# Patient Record
Sex: FEMALE | Race: OTHER | HISPANIC OR LATINO
[De-identification: names, ages, dates, MRNs, and addresses within clinical notes are randomized per-mention and may not be internally consistent; named-entity substitution may affect disease eponyms.]

---

## 2021-06-07 ENCOUNTER — APPOINTMENT (OUTPATIENT)
Dept: PULMONOLOGY | Facility: CLINIC | Age: 21
End: 2021-06-07
Payer: MEDICAID

## 2021-06-07 VITALS
OXYGEN SATURATION: 99 % | HEART RATE: 88 BPM | HEIGHT: 61 IN | DIASTOLIC BLOOD PRESSURE: 76 MMHG | SYSTOLIC BLOOD PRESSURE: 110 MMHG | WEIGHT: 200 LBS | BODY MASS INDEX: 37.76 KG/M2

## 2021-06-07 DIAGNOSIS — Z56.0 UNEMPLOYMENT, UNSPECIFIED: ICD-10-CM

## 2021-06-07 DIAGNOSIS — Z78.9 OTHER SPECIFIED HEALTH STATUS: ICD-10-CM

## 2021-06-07 DIAGNOSIS — Z82.5 FAMILY HISTORY OF ASTHMA AND OTHER CHRONIC LOWER RESPIRATORY DISEASES: ICD-10-CM

## 2021-06-07 PROBLEM — Z00.00 ENCOUNTER FOR PREVENTIVE HEALTH EXAMINATION: Status: ACTIVE | Noted: 2021-06-07

## 2021-06-07 PROCEDURE — T1013A: CUSTOM

## 2021-06-07 PROCEDURE — 99204 OFFICE O/P NEW MOD 45 MIN: CPT | Mod: 25

## 2021-06-07 RX ORDER — IPRATROPIUM BROMIDE AND ALBUTEROL 20; 100 UG/1; UG/1
SPRAY, METERED RESPIRATORY (INHALATION)
Refills: 0 | Status: ACTIVE | COMMUNITY

## 2021-06-07 RX ORDER — FLUTICASONE PROPIONATE AND SALMETEROL 500; 50 UG/1; UG/1
POWDER RESPIRATORY (INHALATION)
Refills: 0 | Status: ACTIVE | COMMUNITY

## 2021-06-07 SDOH — ECONOMIC STABILITY - INCOME SECURITY: UNEMPLOYMENT, UNSPECIFIED: Z56.0

## 2021-06-07 NOTE — REVIEW OF SYSTEMS
[SOB on Exertion] : sob on exertion [Back Pain] : back pain [Fever] : no fever [Chills] : no chills [Nasal Congestion] : no nasal congestion [Postnasal Drip] : no postnasal drip [Sinus Problems] : no sinus problems [Cough] : no cough [Chest Tightness] : no chest tightness [Sputum] : no sputum [Dyspnea] : no dyspnea [Pleuritic Pain] : no pleuritic pain [Wheezing] : no wheezing [Edema] : no edema [Palpitations] : no palpitations [Syncope] : no syncope [Seasonal Allergies] : no seasonal allergies [GERD] : no gerd [Abdominal Pain] : no abdominal pain [Nausea] : no nausea [Vomiting] : no vomiting [Diarrhea] : no diarrhea [Constipation] : no constipation

## 2021-06-07 NOTE — HISTORY OF PRESENT ILLNESS
[Initial Eval - Existing Diagnosis] : an initial evaluation of an existing diagnosis of [Intermittent] : intermittent [Dyspnea on Exertion] : dyspnea on exertion [Inhaled Long-Acting Beta-2 Agonists] : inhaled long-acting beta-2 agonists [Inhaled Corticosteroids] : inhaled corticosteroids [Good Compliance] : good compliance with treatment [Good Tolerance] : good tolerance of treatment [Good Symptom Control] : good symptom control [Excess Weight] : excess weight [Dyspnea] : dyspnea [Knee Pain] : knee pain [Low Calorie Diet] : low calorie diet [Fair Compliance] : fair compliance with treatment [Fair Tolerance] : fair tolerance of treatment [Poor Symptom Control] : poor symptom control [High] : high [Low Calorie] : low calorie [Well Balanced Diet] : well balanced meals [___ Times/Week] : exercises [unfilled] times per week [Aerobic Conditioning] : aerobic conditioning [Initial Evaluation] : an initial evaluation of [Excessive Daytime Sleepiness] : excessive daytime sleepiness [Snoring] : snoring [Unrefreshing Sleep] : unrefreshing sleep [Sleepy When Sedentary] : sleepy when sedentary [Currently Experiencing] : The patient is currently experiencing symptoms. [None] : The patient is not currently being treated for this problem [Former] : former [Never] : never [TextBox_4] : Pt presents for evaluation of her reported asthma for which she is maintained on Advair and Combivent. She uses her inhalers only as needed. She was dx'd in [place] as a child though does not remember undergoing any testing.\par She has been getting her medications from [place] from her father. She is not followed for her asthma in the [place].\par She is now 9 weeks pregnant and presents for evaluation and management for her reported asthma.

## 2021-06-07 NOTE — CONSULT LETTER
[Dear  ___] : Dear  [unfilled], [Consult Letter:] : I had the pleasure of evaluating your patient, [unfilled]. [Please see my note below.] : Please see my note below. [Consult Closing:] : Thank you very much for allowing me to participate in the care of this patient.  If you have any questions, please do not hesitate to contact me. [Sincerely,] : Sincerely, [FreeTextEntry3] : Damian Bertrand MD, FCCP, D. ABSM\par Pulmonary and Sleep Medicine\par [facility] at [place]

## 2021-06-07 NOTE — DISCUSSION/SUMMARY
[FreeTextEntry1] : \par #1. Will schedule PFTs in near future to assess lung function with Covid testing prior to PFTs\par #2. The patient does not appear to require chronic BD therapy at this time\par #3. Diet and exercise for weight loss\par #4. SOBOE is likely related to weight or deconditioning \par #5. Continue current inhalers as needed\par #6. Eventual Covid vaccines post partum\par #7. Home PSG to evaluate for possible OMA. \par #8. F/u ASAP with PFTs and again after HST\par #9. Reviewed risks of exposure and symptoms of Covid-19 virus, including how the virus is spread and precautions to avoid blanca virus.\par \par Patient's questions were answered and patient appears to understand these recommendations

## 2021-06-07 NOTE — REASON FOR VISIT
[Initial] : an initial visit [Asthma] : asthma [Shortness of Breath] : shortness of breath [Obesity] : obesity [[employer] ] : provided by [employer]   [Sleep Apnea] : sleep apnea [Time Spent: ____ minutes] : I have spent [unfilled] minutes of time on the encounter. The patient's primary language is not English thus required  services. [FreeTextEntry1] : [id number] [FreeTextEntry2] : Jewel [TWNoteComboBox1] : [place]

## 2021-06-09 ENCOUNTER — ASOB RESULT (OUTPATIENT)
Age: 21
End: 2021-06-09

## 2021-06-09 ENCOUNTER — APPOINTMENT (OUTPATIENT)
Dept: ANTEPARTUM | Facility: CLINIC | Age: 21
End: 2021-06-09
Payer: MEDICAID

## 2021-06-09 PROCEDURE — 76801 OB US < 14 WKS SINGLE FETUS: CPT

## 2021-06-22 ENCOUNTER — OUTPATIENT (OUTPATIENT)
Dept: OUTPATIENT SERVICES | Facility: HOSPITAL | Age: 21
LOS: 1 days | End: 2021-06-22
Payer: MEDICAID

## 2021-06-22 ENCOUNTER — APPOINTMENT (OUTPATIENT)
Age: 21
End: 2021-06-22
Payer: MEDICAID

## 2021-06-22 DIAGNOSIS — G47.33 OBSTRUCTIVE SLEEP APNEA (ADULT) (PEDIATRIC): ICD-10-CM

## 2021-06-22 PROCEDURE — 95810 POLYSOM 6/> YRS 4/> PARAM: CPT | Mod: 26

## 2021-06-22 PROCEDURE — 95810 POLYSOM 6/> YRS 4/> PARAM: CPT

## 2021-07-03 ENCOUNTER — APPOINTMENT (OUTPATIENT)
Dept: DISASTER EMERGENCY | Facility: CLINIC | Age: 21
End: 2021-07-03

## 2021-07-03 ENCOUNTER — LABORATORY RESULT (OUTPATIENT)
Age: 21
End: 2021-07-03

## 2021-07-31 ENCOUNTER — APPOINTMENT (OUTPATIENT)
Dept: DISASTER EMERGENCY | Facility: CLINIC | Age: 21
End: 2021-07-31

## 2021-08-02 LAB — SARS-COV-2 N GENE NPH QL NAA+PROBE: NOT DETECTED

## 2021-08-03 ENCOUNTER — APPOINTMENT (OUTPATIENT)
Dept: PULMONOLOGY | Facility: CLINIC | Age: 21
End: 2021-08-03
Payer: MEDICAID

## 2021-08-03 VITALS — WEIGHT: 200 LBS | HEIGHT: 61 IN | BODY MASS INDEX: 37.76 KG/M2

## 2021-08-03 VITALS
OXYGEN SATURATION: 98 % | DIASTOLIC BLOOD PRESSURE: 60 MMHG | TEMPERATURE: 97.4 F | BODY MASS INDEX: 37.79 KG/M2 | HEIGHT: 61 IN | HEART RATE: 74 BPM | SYSTOLIC BLOOD PRESSURE: 120 MMHG

## 2021-08-03 DIAGNOSIS — Z87.891 PERSONAL HISTORY OF NICOTINE DEPENDENCE: ICD-10-CM

## 2021-08-03 DIAGNOSIS — R06.02 SHORTNESS OF BREATH: ICD-10-CM

## 2021-08-03 PROCEDURE — 94010 BREATHING CAPACITY TEST: CPT

## 2021-08-03 PROCEDURE — T1013: CPT

## 2021-08-03 PROCEDURE — 99214 OFFICE O/P EST MOD 30 MIN: CPT | Mod: 25

## 2021-08-03 PROCEDURE — 94729 DIFFUSING CAPACITY: CPT

## 2021-08-03 PROCEDURE — 85018 HEMOGLOBIN: CPT | Mod: QW

## 2021-08-03 PROCEDURE — 94727 GAS DIL/WSHOT DETER LNG VOL: CPT

## 2021-08-03 NOTE — CONSULT LETTER
[Dear  ___] : Dear  [unfilled], [Consult Letter:] : I had the pleasure of evaluating your patient, [unfilled]. [Please see my note below.] : Please see my note below. [Consult Closing:] : Thank you very much for allowing me to participate in the care of this patient.  If you have any questions, please do not hesitate to contact me. [Sincerely,] : Sincerely, [FreeTextEntry3] : Damian Bertrand MD, FCCP, D. ABSM\par Pulmonary and Sleep Medicine\par [facility] at [place]

## 2021-08-03 NOTE — REASON FOR VISIT
[Follow-Up] : a follow-up visit [Asthma] : asthma [Sleep Apnea] : sleep apnea [Shortness of Breath] : shortness of breath [Obesity] : obesity [[employer] ] : provided by [employer]   [Time Spent: ____ minutes] : Total time spent using  services: [unfilled] minutes. The patient's primary language is not English thus required  services. [FreeTextEntry1] : [id number] [FreeTextEntry2] : Bobby Harper [TWNoteComboBox1] : Egyptian

## 2021-08-03 NOTE — HISTORY OF PRESENT ILLNESS
[Former] : former [Never] : never [Intermittent] : intermittent [Dyspnea on Exertion] : dyspnea on exertion [Inhaled Long-Acting Beta-2 Agonists] : inhaled long-acting beta-2 agonists [Inhaled Corticosteroids] : inhaled corticosteroids [Good Compliance] : good compliance with treatment [Good Tolerance] : good tolerance of treatment [Good Symptom Control] : good symptom control [Excess Weight] : excess weight [Dyspnea] : dyspnea [Knee Pain] : knee pain [Low Calorie Diet] : low calorie diet [Fair Compliance] : fair compliance with treatment [Fair Tolerance] : fair tolerance of treatment [Poor Symptom Control] : poor symptom control [High] : high [Low Calorie] : low calorie [Well Balanced Diet] : well balanced meals [___ Times/Week] : exercises [unfilled] times per week [Aerobic Conditioning] : aerobic conditioning [Follow-Up - Routine Clinic] : a routine clinic follow-up of [Excessive Daytime Sleepiness] : excessive daytime sleepiness [Snoring] : snoring [Unrefreshing Sleep] : unrefreshing sleep [Sleepy When Sedentary] : sleepy when sedentary [Currently Experiencing] : The patient is currently experiencing symptoms. [None] : The patient is not currently being treated for this problem [TextBox_4] : Pt presents for evaluation of her reported asthma for which she is maintained on Advair and Combivent. She uses her inhalers only as needed. She was dx'd in [place] as a child though does not remember undergoing any testing.\par She has been getting her medications from [place] from her father. She is not followed for her asthma in the [place].\par She is now 4 months pregnant and presents for evaluation and management for her reported asthma. She is only using her inhaler as needed and rarely feels like she needs them.

## 2021-08-03 NOTE — DISCUSSION/SUMMARY
[FreeTextEntry1] : \par #1. PFTs performed today are essentially normal.\par #2. The patient does not appear to require chronic BD therapy at this time\par #3. SOBOE is likely related to weight or deconditioning given normal PFTs\par #4. Diet and exercise for weight loss  \par #5. Continue current inhalers as needed\par #6. Eventual Covid vaccines post partum\par #7. PSG to evaluate for possible OMA was negative with an AHI of 0.2\par #8. F/u 3-4 months or after delivery\par #9. Reviewed risks of exposure and symptoms of Covid-19 virus, including how the virus is spread and precautions to avoid blanca virus.\par \par Patient's questions were answered and patient appears to understand these recommendations

## 2021-08-25 ENCOUNTER — ASOB RESULT (OUTPATIENT)
Age: 21
End: 2021-08-25

## 2021-08-25 ENCOUNTER — APPOINTMENT (OUTPATIENT)
Dept: ANTEPARTUM | Facility: CLINIC | Age: 21
End: 2021-08-25
Payer: MEDICAID

## 2021-08-25 ENCOUNTER — APPOINTMENT (OUTPATIENT)
Dept: MATERNAL FETAL MEDICINE | Facility: CLINIC | Age: 21
End: 2021-08-25
Payer: MEDICAID

## 2021-08-25 VITALS
WEIGHT: 201 LBS | HEIGHT: 61.5 IN | DIASTOLIC BLOOD PRESSURE: 62 MMHG | RESPIRATION RATE: 18 BRPM | SYSTOLIC BLOOD PRESSURE: 90 MMHG | OXYGEN SATURATION: 98 % | BODY MASS INDEX: 37.46 KG/M2 | HEART RATE: 63 BPM

## 2021-08-25 DIAGNOSIS — J45.909 DISEASES OF THE RESPIRATORY SYSTEM COMPLICATING PREGNANCY, SECOND TRIMESTER: ICD-10-CM

## 2021-08-25 DIAGNOSIS — Z3A.11 11 WEEKS GESTATION OF PREGNANCY: ICD-10-CM

## 2021-08-25 DIAGNOSIS — O99.512 DISEASES OF THE RESPIRATORY SYSTEM COMPLICATING PREGNANCY, SECOND TRIMESTER: ICD-10-CM

## 2021-08-25 DIAGNOSIS — O34.219 MATERNAL CARE FOR UNSPECIFIED TYPE SCAR FROM PREVIOUS CESAREAN DELIVERY: ICD-10-CM

## 2021-08-25 DIAGNOSIS — E66.9 OBESITY, UNSPECIFIED: ICD-10-CM

## 2021-08-25 DIAGNOSIS — O99.212 OBESITY COMPLICATING PREGNANCY, SECOND TRIMESTER: ICD-10-CM

## 2021-08-25 DIAGNOSIS — Z3A.20 20 WEEKS GESTATION OF PREGNANCY: ICD-10-CM

## 2021-08-25 DIAGNOSIS — B97.7 PAPILLOMAVIRUS AS THE CAUSE OF DISEASES CLASSIFIED ELSEWHERE: ICD-10-CM

## 2021-08-25 DIAGNOSIS — Z87.09 PERSONAL HISTORY OF OTHER DISEASES OF THE RESPIRATORY SYSTEM: ICD-10-CM

## 2021-08-25 DIAGNOSIS — G47.33 OBSTRUCTIVE SLEEP APNEA (ADULT) (PEDIATRIC): ICD-10-CM

## 2021-08-25 PROCEDURE — 76817 TRANSVAGINAL US OBSTETRIC: CPT

## 2021-08-25 PROCEDURE — 99205 OFFICE O/P NEW HI 60 MIN: CPT

## 2021-08-25 PROCEDURE — 76811 OB US DETAILED SNGL FETUS: CPT

## 2021-08-25 PROCEDURE — ZZZZZ: CPT

## 2021-08-25 RX ORDER — ASPIRIN 81 MG/1
81 TABLET, CHEWABLE ORAL DAILY
Qty: 90 | Refills: 3 | Status: ACTIVE | COMMUNITY
Start: 2021-08-25 | End: 1900-01-01

## 2021-08-25 RX ORDER — VITAMIN C, CALCIUM, IRON, VITAMIN D3, VITAMIN E, VITAMIN B1, VITAMIN B2, VITAMIN B3, VITAMIN B6, FOLIC ACID, IODINE, ZINC, COPPER, DOCUSATE SODIUM, DOCOSAHEXAENOIC ACID (DHA) 27-1-50 MG
KIT ORAL
Refills: 0 | Status: ACTIVE | COMMUNITY

## 2021-08-29 PROBLEM — O34.219 PREVIOUS CESAREAN SECTION COMPLICATING PREGNANCY: Status: ACTIVE | Noted: 2021-08-25

## 2021-08-29 PROBLEM — O99.212 OBESITY AFFECTING PREGNANCY IN SECOND TRIMESTER: Status: ACTIVE | Noted: 2021-08-25

## 2021-08-29 PROBLEM — O99.512 ASTHMA AFFECTING PREGNANCY IN SECOND TRIMESTER: Status: ACTIVE | Noted: 2021-08-25

## 2021-08-29 PROBLEM — Z3A.20 20 WEEKS GESTATION OF PREGNANCY: Status: ACTIVE | Noted: 2021-08-25

## 2021-08-29 PROBLEM — G47.33 OSA (OBSTRUCTIVE SLEEP APNEA): Status: ACTIVE | Noted: 2021-06-07

## 2021-08-29 NOTE — VITALS
[US Date: ___] : ultrasound performed on [unfilled]. [GA= ___ Days] : and [unfilled] day(s) [By US] : this is the final MARC [GA= ___ Weeks] : Results were GA of [unfilled] weeks [MARC by US (date): ___] : The calculated MARC by US is [unfilled]

## 2021-08-29 NOTE — OB HISTORY
[Pregnancy History] : patient received anesthesia [___] : pregnancy complications occured [LMP: ___] : LMP: [unfilled] [MARC: ___] : MARC: [unfilled] [EGA: ___ wks] : EGA: [unfilled] wks [Spontaneous] : Spontaneous conception [Sonogram] : sonogram [at ___ wks] : at [unfilled] weeks [FreeTextEntry1] : Spontaneous conception with same partner as her last pregnancy\par Pulmonary consultation 8/3/21

## 2021-08-29 NOTE — DATA REVIEWED
[FreeTextEntry1] : TAUS 8/25/21 - breech, anterior placenta, 338 gm, MVP 4.9 cm, limited evaluation of hands, sacral spine, transverse kidneys, remaining visualized anatomy sonographically normal\par \par TVUS 8/25/21 - CL 3. 8 cm \par \par Pulmonary consult 8/3/21 - normal PFTs

## 2021-08-29 NOTE — CHIEF COMPLAINT
[G ___] : G [unfilled] [P ___] : P [unfilled] [de-identified] : recurrent pregnancy loss, asthma in pregnancy, sleep apnea, obesity and prior  delivery

## 2021-08-29 NOTE — HISTORY OF PRESENT ILLNESS
[FreeTextEntry1] : Jayda Brian is a 21 y.o.  with LMP of 21 and MARC of 22 who presents at 20w1d gestation for  consultation secondary to pregnancy complicated by recurrent pregnancy loss, asthma, obesity, sleep apnea and prior  delivery. Her BMI is 37.4 kg/m2.  She feels well and reports no pregnancy complication to date.  She had recent pulmonary consultation and PFTs on 8/3/21 secondary to asthma and sleep apnea.  PFTs were normal and she does require nighttime CPAP.  She reports no recent asthma exacerbation and has not received the COVID vaccine.  She denies OB or constitutional complaint.

## 2021-08-29 NOTE — DISCUSSION/SUMMARY
[FreeTextEntry1] : With the assistance of a Cymro , we reviewed the following:\par \par Asthma and OMA:  Mild and/or well-controlled moderate asthma can be associated with excellent maternal and  pregnancy outcomes. Severe and poorly controlled asthma may be associated with increased prematurity, need for  delivery, preeclampsia, growth restriction, other  complications and maternal morbidity and mortality. The goal of asthma therapy in pregnancy is to maintain adequate oxygenation of the fetus and preventing hypoxic episodes in the mother. Optimal management of asthma during pregnancy includes objective monitoring of lung function, avoiding or controlling asthma triggers, educating patient and individualizing pharmacologic therapy to maintain normal pulmonary function. This step of care therapeutic approach uses the lowest amount of drug intervention necessary to control the patient’s severity of asthma.  Jayda is currently taking Seretide MDI daily and uses a Combivent MDI for rescue therapy.  She does not have daily symptoms.  She was advised to continue therapy as prescribed.  She was instructed to avoid asthma triggers and if daily use of her rescue inhaler is needed, her maintenance therapy may need to be adjusted.  Any acute asthma exacerbation should be assessed promptly. Regarding her OMA, recent PFTs were normal and she does not require nightly CPAP therapy.  Anesthesia consultation is recommended in the third trimester.  She was strongly encouraged to receive COVID vaccination and safety in pregnancy was reviewed.  Jayda states vaccination is available at her OB office and she will make an appointment for administration. \par \par Elevated maternal BMI:  A BMI > 30 kg/m2 substantially increases the risk of almost every  complication, including but not limited to diabetes, hypertension,  delivery, and birth trauma. Current recommendations are for Jayda to gain no more than 11-20 pounds throughout her pregnancy.   We reviewed healthy diet and lifestyle choices in pregnancy.  Again, serial ultrasound evaluation of fetal growth is recommended. \par \par Prior CD:  Jayda expresses interest in a trial of labor after  delivery (TOLAC).  We reviewed the risks, benefits, and alternatives of TOLAC versus an elective repeat  delivery (CD). Overall, women who attempt TOLAC have an approximate 75% success rate.  The rate is affected by various antepartum, intrapartum, and nonmedical factors.  The highest success rates are found among women who have had a successful vaginal delivery before or after their  delivery, who present in active labor, and in whom the indication for the prior  delivery was fetal malpresentation. Conversely, women who have never had a vaginal delivery, need labor induction (especially with an unfavorable cervix), had a previous  for failure to progress or nonreassuring tracing, have a macrosomic infant, or are post-term have a reduced likelihood of a successful TOLAC.    Patients with a previous low transverse uterine incision and no contraindications for vaginal delivery are candidates for TOLAC. Although there is no reliable way to predict TOLAC success, we discussed that optimal candidates are patients with one previous low transverse CD, a clinically adequate pelvis for vaginal delivery, no other uterine scars or uterine surgery,  a previous successful vaginal delivery, an estimated fetal weight < 4000g, a favorable Lewis score on cervical examination or active labor, non-obese and non-diabetic women, not exceeding the appropriate weight gain for pregnancy and an inter-delivery interval > 18 to 24 months.  Jayda was quoted a 0.5% risk of uterine rupture with spontaneous labor and a doubling of her risk (1%) with induction of labor.  Her chance for successful TOLAC is greater in the presence of spontaneous labor. Jayda is aware there will be ongoing discussion throughout the duration of pregnancy; as conditions may arise that alter the risks versus benefits of the planned route of delivery.   \par \par Recurrent first trimester loss:  OB history is significant for one full term  delivery followed by first trimester ETOP and two first trimester SAB.  We reviewed the various etiology of first trimester loss and she was reassured given her history of full term vaginal delivery.  She will continue low dose aspirin therapy for preeclampsia prevention and to improve overall pregnancy outcome and she was advised to increase dose to 81 mg alternating with 162 mg daily.  No further testing or intervention is warranted at this time.

## 2021-08-29 NOTE — ACTIVE PROBLEMS
[Diabetes Mellitus] : no diabetes mellitus [Hypertension] : no hypertension [Heart Disease] : no heart disease [Autoimmune Disease] : no autoimmune disease [Renal Disease] : no kidney disease, no UTI [Neurologic Disorder] : no neurologic disorder, no epilepsy [Psychiatric Disorders] : no psychiatric disorders [Hepatic Disorder] : no hepatitis, no liver disease [Depression] : no depression, no post partum depression [Thrombophlebitis] : no varicosities, no phlebitis [Thyroid Disorder] : no thyroid dysfunction [Trauma] : no trauma/violence [Blood Transfusion (___ Ml)] : no history of blood transfusion

## 2021-08-29 NOTE — REVIEW OF SYSTEMS
[Fever] : no fever [Sight Problems] : no sight problems [Nasal Discharge] : no nasal discharge [Chest Pain] : no chest pain [Dyspnea] : no shortness of breath [Wheezing] : no wheezing [Cough] : no cough [Abdominal Pain] : no abdominal pain [Vomiting] : no vomiting [Pelvic Pain] : no pelvic pain [Arthralgias] : no arthralgias [Abn Vag Bleeding] : no abnormal vaginal bleeding [Breast Pain] : no breast pain [Headache] : no headache [Anxiety] : no anxiety [Deepening Voice] : no deepening of the voice [Easy Bleeding] : does not bleed easily [Easy Bruising] : does not bruise easily [Feeling Weak] : no feelings of weakness

## 2021-08-29 NOTE — PHYSICAL EXAM
[FreeTextEntry1] : Patient presents as a well appearing gravid and obese female.  Ambulates without difficulty.  Answers questions appropriately without cough or difficulty breathing. There is no peripheral edema or visible rash.  FHR noted on ultrasound.

## 2021-08-29 NOTE — FAMILY HISTORY
[Age 35+ During Pregnancy] : not 35 or over during pregnancy [Reported Family History Of Birth Defects] : no congenital heart defects [Emanuel-Sachs Carrier] : no Emanuel-Sachs [Family History] : no mental retardation/autism [Reported Family History Of Genetic Disease] : no history of child defect in child of baby father

## 2021-09-16 ENCOUNTER — APPOINTMENT (OUTPATIENT)
Dept: ANTEPARTUM | Facility: CLINIC | Age: 21
End: 2021-09-16
Payer: MEDICAID

## 2021-09-16 ENCOUNTER — ASOB RESULT (OUTPATIENT)
Age: 21
End: 2021-09-16

## 2021-09-16 PROCEDURE — 76816 OB US FOLLOW-UP PER FETUS: CPT

## 2021-10-22 ENCOUNTER — ASOB RESULT (OUTPATIENT)
Age: 21
End: 2021-10-22

## 2021-10-22 ENCOUNTER — APPOINTMENT (OUTPATIENT)
Dept: ANTEPARTUM | Facility: CLINIC | Age: 21
End: 2021-10-22
Payer: MEDICAID

## 2021-10-22 PROCEDURE — 76816 OB US FOLLOW-UP PER FETUS: CPT

## 2021-12-03 ENCOUNTER — APPOINTMENT (OUTPATIENT)
Dept: ANTEPARTUM | Facility: CLINIC | Age: 21
End: 2021-12-03
Payer: MEDICAID

## 2021-12-03 ENCOUNTER — ASOB RESULT (OUTPATIENT)
Age: 21
End: 2021-12-03

## 2021-12-03 PROCEDURE — 76816 OB US FOLLOW-UP PER FETUS: CPT

## 2021-12-03 PROCEDURE — 76819 FETAL BIOPHYS PROFIL W/O NST: CPT

## 2021-12-22 ENCOUNTER — OUTPATIENT (OUTPATIENT)
Dept: OUTPATIENT SERVICES | Facility: HOSPITAL | Age: 21
LOS: 1 days | End: 2021-12-22

## 2021-12-22 ENCOUNTER — OUTPATIENT (OUTPATIENT)
Dept: INPATIENT UNIT | Facility: HOSPITAL | Age: 21
LOS: 1 days | End: 2021-12-22
Payer: COMMERCIAL

## 2021-12-22 VITALS
HEART RATE: 79 BPM | WEIGHT: 209.44 LBS | DIASTOLIC BLOOD PRESSURE: 60 MMHG | TEMPERATURE: 98 F | HEIGHT: 62 IN | RESPIRATION RATE: 20 BRPM | SYSTOLIC BLOOD PRESSURE: 100 MMHG

## 2021-12-22 VITALS — HEART RATE: 76 BPM | SYSTOLIC BLOOD PRESSURE: 111 MMHG | DIASTOLIC BLOOD PRESSURE: 63 MMHG

## 2021-12-22 VITALS — DIASTOLIC BLOOD PRESSURE: 59 MMHG | HEART RATE: 78 BPM | SYSTOLIC BLOOD PRESSURE: 102 MMHG

## 2021-12-22 DIAGNOSIS — O47.1 FALSE LABOR AT OR AFTER 37 COMPLETED WEEKS OF GESTATION: ICD-10-CM

## 2021-12-22 DIAGNOSIS — Z01.818 ENCOUNTER FOR OTHER PREPROCEDURAL EXAMINATION: ICD-10-CM

## 2021-12-22 DIAGNOSIS — Z98.891 HISTORY OF UTERINE SCAR FROM PREVIOUS SURGERY: Chronic | ICD-10-CM

## 2021-12-22 LAB
BASOPHILS # BLD AUTO: 0.04 K/UL — SIGNIFICANT CHANGE UP (ref 0–0.2)
BASOPHILS NFR BLD AUTO: 0.3 % — SIGNIFICANT CHANGE UP (ref 0–2)
BLD GP AB SCN SERPL QL: SIGNIFICANT CHANGE UP
EOSINOPHIL # BLD AUTO: 0.36 K/UL — SIGNIFICANT CHANGE UP (ref 0–0.5)
EOSINOPHIL NFR BLD AUTO: 2.7 % — SIGNIFICANT CHANGE UP (ref 0–6)
HCT VFR BLD CALC: 38.6 % — SIGNIFICANT CHANGE UP (ref 34.5–45)
HGB BLD-MCNC: 12.7 G/DL — SIGNIFICANT CHANGE UP (ref 11.5–15.5)
IMM GRANULOCYTES NFR BLD AUTO: 0.4 % — SIGNIFICANT CHANGE UP (ref 0–1.5)
LYMPHOCYTES # BLD AUTO: 2.82 K/UL — SIGNIFICANT CHANGE UP (ref 1–3.3)
LYMPHOCYTES # BLD AUTO: 21.2 % — SIGNIFICANT CHANGE UP (ref 13–44)
MCHC RBC-ENTMCNC: 28.3 PG — SIGNIFICANT CHANGE UP (ref 27–34)
MCHC RBC-ENTMCNC: 32.9 GM/DL — SIGNIFICANT CHANGE UP (ref 32–36)
MCV RBC AUTO: 86.2 FL — SIGNIFICANT CHANGE UP (ref 80–100)
MEV IGG SER-ACNC: 134 AU/ML — SIGNIFICANT CHANGE UP
MEV IGG+IGM SER-IMP: POSITIVE — SIGNIFICANT CHANGE UP
MONOCYTES # BLD AUTO: 0.89 K/UL — SIGNIFICANT CHANGE UP (ref 0–0.9)
MONOCYTES NFR BLD AUTO: 6.7 % — SIGNIFICANT CHANGE UP (ref 2–14)
NEUTROPHILS # BLD AUTO: 9.12 K/UL — HIGH (ref 1.8–7.4)
NEUTROPHILS NFR BLD AUTO: 68.7 % — SIGNIFICANT CHANGE UP (ref 43–77)
PLATELET # BLD AUTO: 295 K/UL — SIGNIFICANT CHANGE UP (ref 150–400)
RBC # BLD: 4.48 M/UL — SIGNIFICANT CHANGE UP (ref 3.8–5.2)
RBC # FLD: 13.2 % — SIGNIFICANT CHANGE UP (ref 10.3–14.5)
WBC # BLD: 13.28 K/UL — HIGH (ref 3.8–10.5)
WBC # FLD AUTO: 13.28 K/UL — HIGH (ref 3.8–10.5)

## 2021-12-22 PROCEDURE — 84112 EVAL AMNIOTIC FLUID PROTEIN: CPT

## 2021-12-22 PROCEDURE — 59025 FETAL NON-STRESS TEST: CPT | Mod: 26

## 2021-12-22 PROCEDURE — G0463: CPT

## 2021-12-22 PROCEDURE — 76815 OB US LIMITED FETUS(S): CPT

## 2021-12-22 PROCEDURE — 59025 FETAL NON-STRESS TEST: CPT

## 2021-12-22 NOTE — OB PROVIDER TRIAGE NOTE - NSOBPROVIDERNOTE_OBGYN_ALL_OB_FT
21y  at 38.1 weeks GA by LMP  who presents to L&D for possible SROM.    A/P:  - VSS  - no pooling of fluids noted, Amnisure negative, FIORELLA of 10.4   - Cervix closed on both speculum and digital exam  - low suspicion for rupture of membranes at this time   - Patient 21y  at 37.1 weeks GA by first trimester sono who presents to L&D for possible SROM.    A/P:  - VSS   - NST reactive   - no pooling of fluids noted, Amnisure negative, FIORELLA of 10.4   - Cervix closed on both speculum and digital exam  - low suspicion for rupture of membranes at this time   - Precautions given  - Dispo: home     Discussed with Dr. Cronin 21y  at 37.1 weeks GA by first trimester sono who presents to L&D for possible SROM.    A/P:  - VSS   - NST reactive   - no pooling of fluids noted, Amnisure negative, FIORELLA of 10.4   - Cervix closed on both speculum and digital exam  - low suspicion for rupture of membranes at this time   - Precautions given  - Dispo: home     Discussed with Dr. Cronin    OB attending:  resident note reviewed   moderate variability +accels no decels  rupture of membranes not suspected at this time, fetal testing reassuring  Janine Cronin MD

## 2021-12-22 NOTE — OB RN TRIAGE NOTE - FALL HARM RISK - UNIVERSAL INTERVENTIONS
Bed in lowest position, wheels locked, appropriate side rails in place/Call bell, personal items and telephone in reach/Instruct patient to call for assistance before getting out of bed or chair/Non-slip footwear when patient is out of bed/[place] to call system/Physically safe environment - no spills, clutter or unnecessary equipment/Purposeful Proactive Rounding/Room/bathroom lighting operational, light cord in reach

## 2021-12-22 NOTE — OB PROVIDER TRIAGE NOTE - HISTORY OF PRESENT ILLNESS
6/2/21 ME PERSISTS , HGA1C IMPROVED TO 8.5. FOCAL LASER WITHIN 1 WEEK. 21y  at 38.1 weeks GA by LMP  who presents to L&D for possible SROM. She states noticing leakage of fluid since Saturday. It was mucous in consistency at first but is now thinner in consistency. She notes no color to it. Endorses noting her underwear has been wet a few times. Patient denies vaginal bleeding, contractions or vaginal discharge. She endorses good fetal movement. Denies fevers, chills, nausea, vomiting, chest pain, SOB, dizziness and headache.   She is scheduled for a repeat .  MARC: 22  LMP: 3/31/21  Prenatal course is significant for:  Hx of asthma - followed by Pulm, currently on combivent, fluticasone prn, patient states she uses 1 week.   Pruritic bullous rash - pt using corticosteroid topic cream     POB:  G1 - 2014 - pC/S FT, uncomplicated  G2 -  - sAB s/p D&C   G3 -  - sAB   G4 -  - sAB  PGYN: -fibroids, -ovarian cysts, denies STD hx, denies abnormal PAPs   PMH: asthma  PSH: C/S x1  SH: Denies EtOH, tobacco and illicit drug use during this pregnancy; feels safe at home   Meds: PNVs, ASA 81 mg   Allergies: NKDA   21y  at 37.1 weeks GA by first trimester sono who presents to L&D for possible SROM. She states noticing leakage of fluid since Saturday. It was mucous in consistency at first but is now thinner in consistency. She notes no color to it. Endorses noting her underwear has been wet a few times. Patient denies vaginal bleeding, contractions or vaginal discharge. She endorses good fetal movement. Denies fevers, chills, nausea, vomiting, chest pain, SOB, dizziness and headache.   She is scheduled for a repeat .    MARC: 22, based on first trimester sono   LMP: 3/31/21  Prenatal course is significant for:  Hx of asthma - followed by Pulm, currently on combivent, fluticasone prn, patient states she uses 1 week.   Pruritic bullous rash - pt using corticosteroid topic cream     POB:  G1 - 2014 - pC/S FT, uncomplicated  G2 -  - sAB s/p D&C   G3 -  - sAB   G4 -  - sAB  PGYN: -fibroids, -ovarian cysts, denies STD hx, denies abnormal PAPs   PMH: asthma  PSH: C/S x1  SH: Denies EtOH, tobacco and illicit drug use during this pregnancy; feels safe at home   Meds: PNVs, ASA 81 mg   Allergies: NKDA

## 2021-12-22 NOTE — OB PROVIDER TRIAGE NOTE - NSHPPHYSICALEXAM_GEN_ALL_CORE
T(C): 36.8 (12-22-21 @ 09:02), Max: 36.8 (12-22-21 @ 09:02)  HR: 76 (12-22-21 @ 09:02) (76 - 79)  BP: 111/63 (12-22-21 @ 09:02) (100/60 - 111/63)  RR: 16 (12-22-21 @ 09:02) (16 - 20)  SpO2: --    Gen: NAD, well-appearing, AAOx3   Abd: Soft, gravid  Ext: non-tender, non-edematous  SSE: no pooling of fluids, no lesions or vaginal bleeding noted. Physiologic discharge seen  SVE:  0/0/-3  Bedside sono: vertex, anterior palcenta, FIORELLA 10.4   FHT: 150 bpm,moderate variability, +accels, -deccel  [place]: no ctx      A/P:   -Admit to L&D  -Consent  -Admission labs  -NPO, except ice chips   -IV fluids  -Labor: Intact/*ROM. Latent/Active labor. [place] *.   -Fetus: Cat I tracing. Continuous toco and fetal monitoring.   -GBS: Negative, no GBS ppx required   -Analgesia:     Discussed with  _ T(C): 36.8 (12-22-21 @ 09:02), Max: 36.8 (12-22-21 @ 09:02)  HR: 76 (12-22-21 @ 09:02) (76 - 79)  BP: 111/63 (12-22-21 @ 09:02) (100/60 - 111/63)  RR: 16 (12-22-21 @ 09:02) (16 - 20)  SpO2: --    Gen: NAD, well-appearing, AAOx3   Abd: Soft, gravid  Ext: non-tender, non-edematous  SSE: no pooling of fluids, no lesions or vaginal bleeding noted. Physiologic discharge seen  SVE:  0/0/-3  Bedside sono: vertex, anterior palcenta, FIORELLA 10.4   FHT: 150 bpm,moderate variability, +accels, -deccel  [place]: no ctx

## 2021-12-30 ENCOUNTER — ASOB RESULT (OUTPATIENT)
Age: 21
End: 2021-12-30

## 2021-12-30 ENCOUNTER — APPOINTMENT (OUTPATIENT)
Dept: ANTEPARTUM | Facility: CLINIC | Age: 21
End: 2021-12-30
Payer: MEDICAID

## 2021-12-30 PROCEDURE — 76819 FETAL BIOPHYS PROFIL W/O NST: CPT

## 2021-12-30 PROCEDURE — 76816 OB US FOLLOW-UP PER FETUS: CPT

## 2022-01-03 RX ORDER — OXYTOCIN 10 UNIT/ML
333.33 VIAL (ML) INJECTION
Qty: 20 | Refills: 0 | Status: COMPLETED | OUTPATIENT
Start: 2022-01-07 | End: 2022-01-07

## 2022-01-03 RX ORDER — SODIUM CHLORIDE 9 MG/ML
1000 INJECTION, SOLUTION INTRAVENOUS
Refills: 0 | Status: DISCONTINUED | OUTPATIENT
Start: 2022-01-07 | End: 2022-01-07

## 2022-01-03 RX ORDER — CEFAZOLIN SODIUM 1 G
2000 VIAL (EA) INJECTION ONCE
Refills: 0 | Status: COMPLETED | OUTPATIENT
Start: 2022-01-07 | End: 2022-01-07

## 2022-01-03 RX ORDER — FAMOTIDINE 10 MG/ML
20 INJECTION INTRAVENOUS ONCE
Refills: 0 | Status: COMPLETED | OUTPATIENT
Start: 2022-01-07 | End: 2022-01-07

## 2022-01-03 RX ORDER — CITRIC ACID/SODIUM CITRATE 300-500 MG
30 SOLUTION, ORAL ORAL ONCE
Refills: 0 | Status: COMPLETED | OUTPATIENT
Start: 2022-01-07 | End: 2022-01-07

## 2022-01-03 RX ORDER — SODIUM CHLORIDE 9 MG/ML
1000 INJECTION, SOLUTION INTRAVENOUS ONCE
Refills: 0 | Status: DISCONTINUED | OUTPATIENT
Start: 2022-01-07 | End: 2022-01-07

## 2022-01-03 NOTE — OB PROVIDER H&P - ASSESSMENT
Jayda Waterman is a 21 year old  at 39w1d who presents to L&D for rCS.    Jayda Waterman is a 21 year old  at 39w3d who presents to L&D for rCS.

## 2022-01-03 NOTE — OB PROVIDER H&P - HISTORY OF PRESENT ILLNESS
Jayda Waterman is a 21 year old  at 39w1d who presents to L&D for rCS.     MARC: 22   LMP: 21     Pregnancy course:    1. Hx of recurrent pregnancy loss, on ASA   2. Hx of asthma, Combivent and fluticasone prn     POB:   G1 - 2014 - pC/S FT, uncomplicated   G2 -  - sAB s/p D&C    G3 -  - sAB    G4 -  – sAB   G5 - current   PGYN: -fibroids, -ovarian cysts, denies STD hx, denies abnormal PAPs    PMH: asthma   PSH: C/S x1   SH: Denies EtOH, tobacco and illicit drug use during this pregnancy   Meds: PNVs, ASA 81 mg, Combivent prn, fluticasone prn   Allergies: NKDA     BMI: 38.54   Ultrasound: VTX, anterior ()   EFW: 3175g ()  Jayda Waterman is a 21 year old  at 39w3d who presents to L&D for rCS.     MARC: 22   LMP: 21     Pregnancy course:    1. Hx of recurrent pregnancy loss, on ASA   2. Hx of asthma, Combivent and fluticasone prn     POB:   G1 - 2014 - pC/S FT, uncomplicated   G2 -  - sAB s/p D&C    G3 -  - sAB    G4 -  – sAB   G5 - current   PGYN: -fibroids, -ovarian cysts, denies STD hx, denies abnormal PAPs    PMH: asthma   PSH: C/S x1   SH: Denies EtOH, tobacco and illicit drug use during this pregnancy   Meds: PNVs, ASA 81 mg, Combivent prn, fluticasone prn   Allergies: NKDA     BMI: 38.54   Ultrasound: VTX, anterior ()   EFW: 3175g ()

## 2022-01-05 ENCOUNTER — OUTPATIENT (OUTPATIENT)
Dept: INPATIENT UNIT | Facility: HOSPITAL | Age: 22
LOS: 1 days | End: 2022-01-05
Payer: COMMERCIAL

## 2022-01-05 DIAGNOSIS — Z98.891 HISTORY OF UTERINE SCAR FROM PREVIOUS SURGERY: Chronic | ICD-10-CM

## 2022-01-05 DIAGNOSIS — O47.1 FALSE LABOR AT OR AFTER 37 COMPLETED WEEKS OF GESTATION: ICD-10-CM

## 2022-01-05 PROBLEM — J45.909 UNSPECIFIED ASTHMA, UNCOMPLICATED: Chronic | Status: ACTIVE | Noted: 2021-12-22

## 2022-01-05 PROBLEM — O02.1 MISSED ABORTION: Chronic | Status: ACTIVE | Noted: 2021-12-22

## 2022-01-05 LAB
HCT VFR BLD CALC: 41.1 % — SIGNIFICANT CHANGE UP (ref 34.5–45)
HGB BLD-MCNC: 13.4 G/DL — SIGNIFICANT CHANGE UP (ref 11.5–15.5)
MCHC RBC-ENTMCNC: 28.5 PG — SIGNIFICANT CHANGE UP (ref 27–34)
MCHC RBC-ENTMCNC: 32.6 GM/DL — SIGNIFICANT CHANGE UP (ref 32–36)
MCV RBC AUTO: 87.4 FL — SIGNIFICANT CHANGE UP (ref 80–100)
PLATELET # BLD AUTO: 358 K/UL — SIGNIFICANT CHANGE UP (ref 150–400)
RBC # BLD: 4.7 M/UL — SIGNIFICANT CHANGE UP (ref 3.8–5.2)
RBC # FLD: 13.3 % — SIGNIFICANT CHANGE UP (ref 10.3–14.5)
SARS-COV-2 RNA SPEC QL NAA+PROBE: SIGNIFICANT CHANGE UP
WBC # BLD: 13.01 K/UL — HIGH (ref 3.8–10.5)
WBC # FLD AUTO: 13.01 K/UL — HIGH (ref 3.8–10.5)

## 2022-01-05 PROCEDURE — U0005: CPT

## 2022-01-05 PROCEDURE — 99213 OFFICE O/P EST LOW 20 MIN: CPT | Mod: TH,25

## 2022-01-05 PROCEDURE — 36415 COLL VENOUS BLD VENIPUNCTURE: CPT

## 2022-01-05 PROCEDURE — 59025 FETAL NON-STRESS TEST: CPT | Mod: 26

## 2022-01-05 PROCEDURE — U0003: CPT

## 2022-01-05 PROCEDURE — G0463: CPT

## 2022-01-05 PROCEDURE — 85027 COMPLETE CBC AUTOMATED: CPT

## 2022-01-05 PROCEDURE — 59025 FETAL NON-STRESS TEST: CPT

## 2022-01-05 NOTE — OB PROVIDER TRIAGE NOTE - ADDITIONAL INSTRUCTIONS
rescheduled for Friday 9:30 am.  Patient is aware she needs to not eat or drink after Midnight on Friday morning and that her partner will need a new PCR for Covid today in preparation for Friday.

## 2022-01-05 NOTE — OB PROVIDER TRIAGE NOTE - HISTORY OF PRESENT ILLNESS
22 yo  at 39.1 weeks missed her scheduled repeat C/S this am due to transportation issues. She reports good fetal movement, no contractions, loss of fluid or vaginal bleeding.   PNC: routine, defers TOLAC

## 2022-01-06 ENCOUNTER — TRANSCRIPTION ENCOUNTER (OUTPATIENT)
Age: 22
End: 2022-01-06

## 2022-01-07 ENCOUNTER — INPATIENT (INPATIENT)
Facility: HOSPITAL | Age: 22
LOS: 1 days | Discharge: HOME | End: 2022-01-09
Attending: OBSTETRICS & GYNECOLOGY | Admitting: OBSTETRICS & GYNECOLOGY
Payer: COMMERCIAL

## 2022-01-07 ENCOUNTER — TRANSCRIPTION ENCOUNTER (OUTPATIENT)
Age: 22
End: 2022-01-07

## 2022-01-07 VITALS — TEMPERATURE: 99 F

## 2022-01-07 DIAGNOSIS — O34.219 MATERNAL CARE FOR UNSPECIFIED TYPE SCAR FROM PREVIOUS CESAREAN DELIVERY: ICD-10-CM

## 2022-01-07 DIAGNOSIS — Z98.891 HISTORY OF UTERINE SCAR FROM PREVIOUS SURGERY: Chronic | ICD-10-CM

## 2022-01-07 LAB
BASOPHILS # BLD AUTO: 0.04 K/UL — SIGNIFICANT CHANGE UP (ref 0–0.2)
BASOPHILS NFR BLD AUTO: 0.3 % — SIGNIFICANT CHANGE UP (ref 0–2)
BLD GP AB SCN SERPL QL: SIGNIFICANT CHANGE UP
COVID-19 SPIKE DOMAIN AB INTERP: POSITIVE
COVID-19 SPIKE DOMAIN ANTIBODY RESULT: >250 U/ML — HIGH
EOSINOPHIL # BLD AUTO: 0.28 K/UL — SIGNIFICANT CHANGE UP (ref 0–0.5)
EOSINOPHIL NFR BLD AUTO: 2.2 % — SIGNIFICANT CHANGE UP (ref 0–6)
HCT VFR BLD CALC: 39.9 % — SIGNIFICANT CHANGE UP (ref 34.5–45)
HGB BLD-MCNC: 13.2 G/DL — SIGNIFICANT CHANGE UP (ref 11.5–15.5)
IMM GRANULOCYTES NFR BLD AUTO: 0.4 % — SIGNIFICANT CHANGE UP (ref 0–1.5)
LYMPHOCYTES # BLD AUTO: 19.3 % — SIGNIFICANT CHANGE UP (ref 13–44)
LYMPHOCYTES # BLD AUTO: 2.41 K/UL — SIGNIFICANT CHANGE UP (ref 1–3.3)
MCHC RBC-ENTMCNC: 28.6 PG — SIGNIFICANT CHANGE UP (ref 27–34)
MCHC RBC-ENTMCNC: 33.1 GM/DL — SIGNIFICANT CHANGE UP (ref 32–36)
MCV RBC AUTO: 86.6 FL — SIGNIFICANT CHANGE UP (ref 80–100)
MONOCYTES # BLD AUTO: 0.76 K/UL — SIGNIFICANT CHANGE UP (ref 0–0.9)
MONOCYTES NFR BLD AUTO: 6.1 % — SIGNIFICANT CHANGE UP (ref 2–14)
NEUTROPHILS # BLD AUTO: 8.96 K/UL — HIGH (ref 1.8–7.4)
NEUTROPHILS NFR BLD AUTO: 71.7 % — SIGNIFICANT CHANGE UP (ref 43–77)
PLATELET # BLD AUTO: 325 K/UL — SIGNIFICANT CHANGE UP (ref 150–400)
RBC # BLD: 4.61 M/UL — SIGNIFICANT CHANGE UP (ref 3.8–5.2)
RBC # FLD: 13.2 % — SIGNIFICANT CHANGE UP (ref 10.3–14.5)
SARS-COV-2 IGG+IGM SERPL QL IA: >250 U/ML — HIGH
SARS-COV-2 IGG+IGM SERPL QL IA: POSITIVE
WBC # BLD: 12.5 K/UL — HIGH (ref 3.8–10.5)
WBC # FLD AUTO: 12.5 K/UL — HIGH (ref 3.8–10.5)

## 2022-01-07 RX ORDER — ACETAMINOPHEN 500 MG
1000 TABLET ORAL ONCE
Refills: 0 | Status: COMPLETED | OUTPATIENT
Start: 2022-01-07 | End: 2022-01-07

## 2022-01-07 RX ORDER — SODIUM CHLORIDE 9 MG/ML
1000 INJECTION, SOLUTION INTRAVENOUS
Refills: 0 | Status: DISCONTINUED | OUTPATIENT
Start: 2022-01-07 | End: 2022-01-09

## 2022-01-07 RX ORDER — IBUPROFEN 200 MG
600 TABLET ORAL EVERY 6 HOURS
Refills: 0 | Status: COMPLETED | OUTPATIENT
Start: 2022-01-07 | End: 2022-12-06

## 2022-01-07 RX ORDER — DIPHENHYDRAMINE HCL 50 MG
12.5 CAPSULE ORAL EVERY 4 HOURS
Refills: 0 | Status: DISCONTINUED | OUTPATIENT
Start: 2022-01-07 | End: 2022-01-09

## 2022-01-07 RX ORDER — ACETAMINOPHEN 500 MG
975 TABLET ORAL
Refills: 0 | Status: DISCONTINUED | OUTPATIENT
Start: 2022-01-07 | End: 2022-01-09

## 2022-01-07 RX ORDER — OXYCODONE HYDROCHLORIDE 5 MG/1
5 TABLET ORAL
Refills: 0 | Status: DISCONTINUED | OUTPATIENT
Start: 2022-01-08 | End: 2022-01-09

## 2022-01-07 RX ORDER — KETOROLAC TROMETHAMINE 30 MG/ML
30 SYRINGE (ML) INJECTION EVERY 6 HOURS
Refills: 0 | Status: COMPLETED | OUTPATIENT
Start: 2022-01-07 | End: 2022-01-08

## 2022-01-07 RX ORDER — ONDANSETRON 8 MG/1
4 TABLET, FILM COATED ORAL EVERY 6 HOURS
Refills: 0 | Status: DISCONTINUED | OUTPATIENT
Start: 2022-01-07 | End: 2022-01-09

## 2022-01-07 RX ORDER — DIPHENHYDRAMINE HCL 50 MG
25 CAPSULE ORAL EVERY 6 HOURS
Refills: 0 | Status: DISCONTINUED | OUTPATIENT
Start: 2022-01-08 | End: 2022-01-09

## 2022-01-07 RX ORDER — ENOXAPARIN SODIUM 100 MG/ML
60 INJECTION SUBCUTANEOUS EVERY 24 HOURS
Refills: 0 | Status: DISCONTINUED | OUTPATIENT
Start: 2022-01-07 | End: 2022-01-09

## 2022-01-07 RX ORDER — OXYCODONE HYDROCHLORIDE 5 MG/1
5 TABLET ORAL ONCE
Refills: 0 | Status: DISCONTINUED | OUTPATIENT
Start: 2022-01-08 | End: 2022-01-09

## 2022-01-07 RX ORDER — TETANUS TOXOID, REDUCED DIPHTHERIA TOXOID AND ACELLULAR PERTUSSIS VACCINE, ADSORBED 5; 2.5; 8; 8; 2.5 [IU]/.5ML; [IU]/.5ML; UG/.5ML; UG/.5ML; UG/.5ML
0.5 SUSPENSION INTRAMUSCULAR ONCE
Refills: 0 | Status: DISCONTINUED | OUTPATIENT
Start: 2022-01-07 | End: 2022-01-09

## 2022-01-07 RX ORDER — SIMETHICONE 80 MG/1
80 TABLET, CHEWABLE ORAL EVERY 4 HOURS
Refills: 0 | Status: DISCONTINUED | OUTPATIENT
Start: 2022-01-07 | End: 2022-01-09

## 2022-01-07 RX ORDER — MAGNESIUM HYDROXIDE 400 MG/1
30 TABLET, CHEWABLE ORAL
Refills: 0 | Status: DISCONTINUED | OUTPATIENT
Start: 2022-01-07 | End: 2022-01-09

## 2022-01-07 RX ORDER — OXYCODONE HYDROCHLORIDE 5 MG/1
5 TABLET ORAL
Refills: 0 | Status: DISCONTINUED | OUTPATIENT
Start: 2022-01-07 | End: 2022-01-09

## 2022-01-07 RX ORDER — NALOXONE HYDROCHLORIDE 4 MG/.1ML
0.1 SPRAY NASAL
Refills: 0 | Status: DISCONTINUED | OUTPATIENT
Start: 2022-01-08 | End: 2022-01-09

## 2022-01-07 RX ORDER — OXYTOCIN 10 UNIT/ML
333.33 VIAL (ML) INJECTION
Qty: 20 | Refills: 0 | Status: DISCONTINUED | OUTPATIENT
Start: 2022-01-07 | End: 2022-01-09

## 2022-01-07 RX ORDER — TETANUS TOXOID, REDUCED DIPHTHERIA TOXOID AND ACELLULAR PERTUSSIS VACCINE, ADSORBED 5; 2.5; 8; 8; 2.5 [IU]/.5ML; [IU]/.5ML; UG/.5ML; UG/.5ML; UG/.5ML
0.5 SUSPENSION INTRAMUSCULAR ONCE
Refills: 0 | Status: DISCONTINUED | OUTPATIENT
Start: 2022-01-08 | End: 2022-01-09

## 2022-01-07 RX ORDER — DIPHENHYDRAMINE HCL 50 MG
25 CAPSULE ORAL EVERY 6 HOURS
Refills: 0 | Status: DISCONTINUED | OUTPATIENT
Start: 2022-01-07 | End: 2022-01-09

## 2022-01-07 RX ORDER — METOCLOPRAMIDE HCL 10 MG
10 TABLET ORAL ONCE
Refills: 0 | Status: COMPLETED | OUTPATIENT
Start: 2022-01-07 | End: 2022-01-07

## 2022-01-07 RX ORDER — OXYCODONE HYDROCHLORIDE 5 MG/1
5 TABLET ORAL ONCE
Refills: 0 | Status: DISCONTINUED | OUTPATIENT
Start: 2022-01-07 | End: 2022-01-09

## 2022-01-07 RX ORDER — KETOROLAC TROMETHAMINE 30 MG/ML
15 SYRINGE (ML) INJECTION ONCE
Refills: 0 | Status: DISCONTINUED | OUTPATIENT
Start: 2022-01-08 | End: 2022-01-09

## 2022-01-07 RX ORDER — LANOLIN
1 OINTMENT (GRAM) TOPICAL EVERY 6 HOURS
Refills: 0 | Status: DISCONTINUED | OUTPATIENT
Start: 2022-01-07 | End: 2022-01-09

## 2022-01-07 RX ORDER — IBUPROFEN 200 MG
600 TABLET ORAL EVERY 6 HOURS
Refills: 0 | Status: DISCONTINUED | OUTPATIENT
Start: 2022-01-08 | End: 2022-01-09

## 2022-01-07 RX ADMIN — FAMOTIDINE 20 MILLIGRAM(S): 10 INJECTION INTRAVENOUS at 17:25

## 2022-01-07 RX ADMIN — Medication 30 MILLILITER(S): at 17:20

## 2022-01-07 RX ADMIN — Medication 10 MILLIGRAM(S): at 19:40

## 2022-01-07 RX ADMIN — Medication 100 MILLIGRAM(S): at 17:50

## 2022-01-07 RX ADMIN — Medication 400 MILLIGRAM(S): at 21:25

## 2022-01-07 RX ADMIN — Medication 1000 MILLIUNIT(S)/MIN: at 18:25

## 2022-01-07 NOTE — OB RN DELIVERY SUMMARY - NS_SEPSISRSKCALC_OBGYN_ALL_OB_FT
EOS calculated successfully. EOS Risk Factor: 0.5/1000 live births ([facility] national incidence); GA=40w3d; Temp=98.96; ROM=0.017; GBS='Negative'; Antibiotics='No antibiotics or any antibiotics < 2 hrs prior to birth'

## 2022-01-07 NOTE — OB PROVIDER H&P - NSANTENATALSTERA_OBGYN_ALL_OB
Not applicable as gestational age is greater than or equal to 34 weeks.
Not applicable as gestational age is greater than or equal to 34 weeks.

## 2022-01-07 NOTE — DISCHARGE NOTE OB - NS MD DC FALL RISK RISK
For information on Fall & Injury Prevention, visit: [email or web address] OR  [email or web address] OR  https://www.cdc.gov/steadi/patient.html

## 2022-01-07 NOTE — DISCHARGE NOTE OB - PATIENT PORTAL LINK FT
You can access the FollowMyHealth Patient Portal offered by [facility] by registering at the following website: [email or web address]. By joining [facility]’s FollowMyHealth portal, you will also be able to view your health information using other applications (apps) compatible with our system.

## 2022-01-07 NOTE — OB PROVIDER H&P - NSPPHNORISK_OBGYN_ALL_OB
After evaluating the patient it has been determined they are at risk for postpartum hemorrhage.
After evaluating the patient it has been determined they are at risk for postpartum hemorrhage.

## 2022-01-07 NOTE — OB PROVIDER H&P - NSHPPHYSICALEXAM_GEN_ALL_CORE
Vital Signs Last 24 Hrs  T(C): 36.2 (07 Jan 2022 12:45), Max: 37.2 (07 Jan 2022 12:42)  T(F): 97.2 (07 Jan 2022 12:45), Max: 98.96 (07 Jan 2022 12:42)  HR: 66 (07 Jan 2022 12:45) (66 - 66)  BP: 110/59 (07 Jan 2022 12:45) (110/59 - 110/59)  RR: 20 (07 Jan 2022 12:45) (20 - 20)    Gen: no acute distress  CV: regular rate and ryhthmn   Pulm: clear bilaterally to auscultation  Abd: nontender; gravid  Ext: no calf tenderness; +1 swelling     Tracing: baseline 150, moderate variability, no cels, no decels  [place]: no ctx  SVE:  deferred

## 2022-01-07 NOTE — OB RN INTRAOPERATIVE NOTE - NSSELHIDDEN_OBGYN_ALL_OB_FT
[NS_DeliveryAttending1_OBGYN_ALL_OB_FT:[email or web address]] [NS_DeliveryAttending1_OBGYN_ALL_OB_FT:[email or web address]=],[NS_DeliveryAssist1_OBGYN_ALL_OB_FT:[id number]=],[NS_DeliveryRN_OBGYN_ALL_OB_FT:[id number]=]

## 2022-01-07 NOTE — OB PROVIDER H&P - ASSESSMENT
GuevaraJayda is a 21 year old  at 39w3d who presents to L&D for rCS.       -Admit to L&D  -Consent  -Admission labs  -IV fluids  -DVT Ppx: SCDS; plan for anticoagulation post-op  -Preoperative antibiotics: ancef   -FHT: Cat I  --section    Discussed with Dr. Almaraz

## 2022-01-07 NOTE — OB PROVIDER DELIVERY SUMMARY - NSSELHIDDEN_OBGYN_ALL_OB_FT
[NS_DeliveryAttending1_OBGYN_ALL_OB_FT:[email or web address]=],[NS_DeliveryAssist1_OBGYN_ALL_OB_FT:[id number]=],[NS_DeliveryRN_OBGYN_ALL_OB_FT:[id number]=]

## 2022-01-07 NOTE — DISCHARGE NOTE OB - CARE PROVIDER_API CALL
Yasmin Almaraz)  Obstetrics and Gynecology  [address] [place] [address]  Phone: [phone]  Fax: [phone]  Established Patient  Follow Up Time: 2 weeks

## 2022-01-07 NOTE — OB PROVIDER H&P - HISTORY OF PRESENT ILLNESS
Jayda Wtaerman is a 21 year old  at 39w3d who presents to L&D for rCS.   Denies vaginal bleeding, loss of fluid or regular contractions. +fetal movement, unchanged.     MARC: 22   LMP: 21     Pregnancy course:    1. Hx of recurrent pregnancy loss, on ASA   2. Hx of asthma, Combivent and fluticasone prn     POB:   G1 - 2014 - pC/S FT, uncomplicated   G2 -  - sAB s/p D&C    G3 -  - sAB    G4 -  – sAB   G5 - current   PGYN: -fibroids, -ovarian cysts, denies STD hx, denies abnormal PAPs    PMH: asthma   PSH: C/S x1   SH: Denies EtOH, tobacco and illicit drug use during this pregnancy   Meds: PNVs, ASA 81 mg, Combivent prn, fluticasone prn   Allergies: NKDA     BMI: 38.54   Ultrasound: VTX, anterior ()   EFW: 3175g ()

## 2022-01-07 NOTE — DISCHARGE NOTE OB - CARE PLAN
1 Principal Discharge DX:	 delivery delivered  Assessment and plan of treatment:	Assessment and Plan of Treatment: Please call your provider to schedule postoperative wound check visit in 1-2 weeks. Take medications as directed, regular diet, activity as tolerated. Exclusive breast feeding for the first 6 months is recommended. Nothing per vagina for 6 weeks (incl. sex, douching, etc). If you have additional concerns, please inform your provider.

## 2022-01-07 NOTE — OB PROVIDER H&P - NSOBVTERISKREFER_OBGYN_ALL_OB
Refer to the Assessment tab to view/cancel completed assessment.
Refer to the Assessment tab to view/cancel completed assessment.

## 2022-01-07 NOTE — OB PROVIDER H&P - NSOBVTERISKASSESS_OBGYN_ALL_OB_FT
OBAntePartum Assessment Completed on: 03-Jan-2022 16:56
OBAntePartum Assessment Completed on: 03-Jan-2022 16:56

## 2022-01-07 NOTE — OB PROVIDER DELIVERY SUMMARY - NSPROVIDERDELIVERYNOTE_OBGYN_ALL_OB_FT
20yo  admitted for rCS at 40w.  Patient was taken to the OR, a repeat low transverse  section was performed and a viable male infant was delivered atraumatically in cephalic presentation at 1806, nuchal cord x1. Placenta delivered at 1807. Hysterotomy, fascia, subcutaneous and skin were reapproximated with suture.. Excellent hemostasis was obtained at each layer of closure.  Apgars 9/9  EBL 475cc.

## 2022-01-07 NOTE — DISCHARGE NOTE OB - MEDICATION SUMMARY - MEDICATIONS TO TAKE
I will START or STAY ON the medications listed below when I get home from the hospital:    acetaminophen 325 mg oral tablet  -- 3 tab(s) by mouth every 6 hours   -- Indication: For as needed for pain    ibuprofen 600 mg oral tablet  -- 1 tab(s) by mouth every 6 hours  -- Indication: For as needed for pain    Prenatal 1 oral capsule  -- 1 cap(s) by mouth once a day  -- Indication: For continue home meds

## 2022-01-08 LAB
BASOPHILS # BLD AUTO: 0.04 K/UL — SIGNIFICANT CHANGE UP (ref 0–0.2)
BASOPHILS NFR BLD AUTO: 0.2 % — SIGNIFICANT CHANGE UP (ref 0–2)
EOSINOPHIL # BLD AUTO: 0.01 K/UL — SIGNIFICANT CHANGE UP (ref 0–0.5)
EOSINOPHIL NFR BLD AUTO: 0 % — SIGNIFICANT CHANGE UP (ref 0–6)
HCT VFR BLD CALC: 35.5 % — SIGNIFICANT CHANGE UP (ref 34.5–45)
HGB BLD-MCNC: 12 G/DL — SIGNIFICANT CHANGE UP (ref 11.5–15.5)
IMM GRANULOCYTES NFR BLD AUTO: 0.5 % — SIGNIFICANT CHANGE UP (ref 0–1.5)
LYMPHOCYTES # BLD AUTO: 1.63 K/UL — SIGNIFICANT CHANGE UP (ref 1–3.3)
LYMPHOCYTES # BLD AUTO: 6.8 % — LOW (ref 13–44)
MANUAL SMEAR VERIFICATION: SIGNIFICANT CHANGE UP
MCHC RBC-ENTMCNC: 29.1 PG — SIGNIFICANT CHANGE UP (ref 27–34)
MCHC RBC-ENTMCNC: 33.8 GM/DL — SIGNIFICANT CHANGE UP (ref 32–36)
MCV RBC AUTO: 86.2 FL — SIGNIFICANT CHANGE UP (ref 80–100)
MONOCYTES # BLD AUTO: 0.97 K/UL — HIGH (ref 0–0.9)
MONOCYTES NFR BLD AUTO: 4.1 % — SIGNIFICANT CHANGE UP (ref 2–14)
NEUTROPHILS # BLD AUTO: 21.05 K/UL — HIGH (ref 1.8–7.4)
NEUTROPHILS NFR BLD AUTO: 88.4 % — HIGH (ref 43–77)
PLAT MORPH BLD: NORMAL — SIGNIFICANT CHANGE UP
PLATELET # BLD AUTO: 295 K/UL — SIGNIFICANT CHANGE UP (ref 150–400)
RBC # BLD: 4.12 M/UL — SIGNIFICANT CHANGE UP (ref 3.8–5.2)
RBC # FLD: 12.9 % — SIGNIFICANT CHANGE UP (ref 10.3–14.5)
RBC BLD AUTO: NORMAL — SIGNIFICANT CHANGE UP
T PALLIDUM AB TITR SER: NEGATIVE — SIGNIFICANT CHANGE UP
WBC # BLD: 23.83 K/UL — HIGH (ref 3.8–10.5)
WBC # FLD AUTO: 23.83 K/UL — HIGH (ref 3.8–10.5)

## 2022-01-08 RX ORDER — OXYTOCIN 10 UNIT/ML
333.33 VIAL (ML) INJECTION
Qty: 20 | Refills: 0 | Status: DISCONTINUED | OUTPATIENT
Start: 2022-01-08 | End: 2022-01-08

## 2022-01-08 RX ORDER — IBUPROFEN 200 MG
1 TABLET ORAL
Qty: 20 | Refills: 0
Start: 2022-01-08 | End: 2022-01-12

## 2022-01-08 RX ORDER — ACETAMINOPHEN 500 MG
3 TABLET ORAL
Qty: 60 | Refills: 0
Start: 2022-01-08 | End: 2022-01-12

## 2022-01-08 RX ORDER — ACETAMINOPHEN 500 MG
975 TABLET ORAL
Refills: 0 | Status: DISCONTINUED | OUTPATIENT
Start: 2022-01-08 | End: 2022-01-08

## 2022-01-08 RX ORDER — IBUPROFEN 200 MG
600 TABLET ORAL EVERY 6 HOURS
Refills: 0 | Status: DISCONTINUED | OUTPATIENT
Start: 2022-01-08 | End: 2022-01-09

## 2022-01-08 RX ORDER — MAGNESIUM HYDROXIDE 400 MG/1
30 TABLET, CHEWABLE ORAL
Refills: 0 | Status: DISCONTINUED | OUTPATIENT
Start: 2022-01-08 | End: 2022-01-08

## 2022-01-08 RX ORDER — SIMETHICONE 80 MG/1
80 TABLET, CHEWABLE ORAL EVERY 4 HOURS
Refills: 0 | Status: DISCONTINUED | OUTPATIENT
Start: 2022-01-08 | End: 2022-01-08

## 2022-01-08 RX ORDER — LANOLIN
1 OINTMENT (GRAM) TOPICAL EVERY 6 HOURS
Refills: 0 | Status: DISCONTINUED | OUTPATIENT
Start: 2022-01-08 | End: 2022-01-08

## 2022-01-08 RX ADMIN — Medication 30 MILLIGRAM(S): at 01:02

## 2022-01-08 RX ADMIN — Medication 30 MILLIGRAM(S): at 17:27

## 2022-01-08 RX ADMIN — Medication 975 MILLIGRAM(S): at 15:29

## 2022-01-08 RX ADMIN — ONDANSETRON 4 MILLIGRAM(S): 8 TABLET, FILM COATED ORAL at 03:18

## 2022-01-08 RX ADMIN — Medication 30 MILLIGRAM(S): at 05:35

## 2022-01-08 RX ADMIN — SODIUM CHLORIDE 125 MILLILITER(S): 9 INJECTION, SOLUTION INTRAVENOUS at 12:37

## 2022-01-08 RX ADMIN — Medication 975 MILLIGRAM(S): at 20:06

## 2022-01-08 RX ADMIN — Medication 30 MILLIGRAM(S): at 12:30

## 2022-01-08 RX ADMIN — Medication 975 MILLIGRAM(S): at 20:16

## 2022-01-08 RX ADMIN — Medication 1 TABLET(S): at 12:30

## 2022-01-08 RX ADMIN — Medication 30 MILLIGRAM(S): at 01:17

## 2022-01-08 RX ADMIN — ENOXAPARIN SODIUM 60 MILLIGRAM(S): 100 INJECTION SUBCUTANEOUS at 05:35

## 2022-01-08 NOTE — PROGRESS NOTE ADULT - ATTENDING COMMENTS
POD1   no complaints  breast and bottle feeding   denies N/V, denies pain, SOB  abd soft, ND, NT, wound intact   ext no edema, NT  plan   routine Post op care  DVT ppx

## 2022-01-09 VITALS
OXYGEN SATURATION: 99 % | TEMPERATURE: 98 F | SYSTOLIC BLOOD PRESSURE: 114 MMHG | HEART RATE: 80 BPM | DIASTOLIC BLOOD PRESSURE: 80 MMHG | RESPIRATION RATE: 18 BRPM

## 2022-01-09 PROCEDURE — 36415 COLL VENOUS BLD VENIPUNCTURE: CPT

## 2022-01-09 PROCEDURE — 86769 SARS-COV-2 COVID-19 ANTIBODY: CPT

## 2022-01-09 PROCEDURE — 86900 BLOOD TYPING SEROLOGIC ABO: CPT

## 2022-01-09 PROCEDURE — 86901 BLOOD TYPING SEROLOGIC RH(D): CPT

## 2022-01-09 PROCEDURE — 85025 COMPLETE CBC W/AUTO DIFF WBC: CPT

## 2022-01-09 PROCEDURE — 86780 TREPONEMA PALLIDUM: CPT

## 2022-01-09 PROCEDURE — 86850 RBC ANTIBODY SCREEN: CPT

## 2022-01-09 RX ADMIN — Medication 975 MILLIGRAM(S): at 02:15

## 2022-01-09 RX ADMIN — Medication 975 MILLIGRAM(S): at 09:08

## 2022-01-09 RX ADMIN — ENOXAPARIN SODIUM 60 MILLIGRAM(S): 100 INJECTION SUBCUTANEOUS at 02:14

## 2022-01-09 RX ADMIN — Medication 600 MILLIGRAM(S): at 06:23

## 2022-01-09 RX ADMIN — Medication 600 MILLIGRAM(S): at 06:24

## 2022-01-09 RX ADMIN — Medication 975 MILLIGRAM(S): at 02:14

## 2022-01-09 NOTE — PROGRESS NOTE ADULT - SUBJECTIVE AND OBJECTIVE BOX
Delivery Progress Note    IVANA GARCIA is a 21y  who is post-op day #2 who delivered a male infant at 40w3d by  delivery.     SUBJECTIVE:  No acute events overnight. Pain is well controlled with PRN pain medication. No problems with ambulating, voiding, or PO intake. Has had flatus but no BM. Denies nausea and vomiting. Patient is having appropiate lochia which is decreasing.      OBJECTIVE:  Physical exam:  Vital Signs Last 24 Hrs  T(C): 36.7 (2022 04:50), Max: 37 (2022 07:57)  T(F): 98 (2022 04:50), Max: 98.6 (2022 07:57)  HR: 80 (2022 04:50) (61 - 80)  BP: 114/80 (2022 04:50) (61/61 - 114/80)  RR: 18 (2022 04:50) (17 - 18)  SpO2: 99% (2022 04:50) (96% - 99%)  General: alert and oriented x3, no acute distress.  Heart: regular rate and rhythm, no murmurs, rubs or gallops appreciated.  Lungs: clear to auscultation bilaterally.   Abdomen: Soft, appropriately tender to palpitation, firm uterine fundus at umbilicus. Incision appears dry and intact.  Extremities: no tenderness, redness or swelling in lower extremities bilaterally.     Labs:                         12.0   23.83 )-----------( 295      ( 2022 06:03 )             35.5         
 Delivery Progress Note    IVANA GARCIA is a 21y  who is post-op day #1 who delivered a male infant at 40w3d by  delivery.     SUBJECTIVE:  No acute events overnight. Pain is well controlled with PRN pain medication. No problems with ambulating, voiding, or PO intake. Has had flatus but no BM. Denies nausea and vomiting. Patient is having appropiate lochia which is decreasing.      OBJECTIVE:  Physical exam:  Vital Signs Last 24 Hrs  T(C): 37 (2022 07:57), Max: 37.2 (2022 12:42)  T(F): 98.6 (2022 07:57), Max: 98.96 (2022 12:42)  HR: 61 (2022 07:57) (52 - 75)  BP: 61/61 (2022 07:57) (61/61 - 114/73)  RR: 18 (2022 07:57) (12 - 20)  SpO2: 96% (2022 07:57) (95% - 99%)  General: alert and oriented x3, no acute distress.  Heart: regular rate and rhythm, no murmurs, rubs or gallops appreciated.  Lungs: clear to auscultation bilaterally.   Abdomen: Soft, appropriately tender to palpitation, firm uterine fundus at umbilicus. Incision appears dry and intact.  Extremities: no tenderness, redness or swelling in lower extremities bilaterally.     Labs:                         12.0   23.83 )-----------( 295      ( 2022 06:03 )             35.5

## 2022-01-09 NOTE — PROGRESS NOTE ADULT - ASSESSMENT
ASSESSMENT  IVANA GARCIA is a 21y  who is post-op day #1 who delivered a male infant at 40w3d by  delivery.  No acute events.     PLAN  1. Routine post-op care  - Continue to encourage ambulation, PO intake and breastfeeding  - DVT prophylaxis: lovenox, SCDs  - Continue pain management PRN  - Plan to discharge per attending approval
ASSESSMENT  IVANA GARCIA is a 21y  who is post-op day #2 who delivered a male infant at 40w3d by  delivery.  No acute events.     PLAN  1. Routine post-op care  - Continue to encourage ambulation, PO intake and breastfeeding  - DVT prophylaxis: lovenox, SCDs  - Continue pain management PRN  - Plan to discharge per attending approval

## 2022-01-09 NOTE — PROGRESS NOTE ADULT - ATTENDING COMMENTS
post rpeat  day # 2  no complaints  exam wnl  labs reviewed    cleared for dc  discussed contraception, vaccination, breastfeeding  follow up for wound check and post partum visit

## 2022-02-17 NOTE — OB PST NOTE - PRO PRENATAL LABS ORI SOURCE HIV
hard copy, drawn during this pregnancy Viral Illness, Pediatric  Viruses are tiny germs that can get into a person's body and cause illness. There are many different types of viruses, and they cause many types of illness. Viral illness in children is very common. A viral illness can cause fever, sore throat, cough, rash, or diarrhea. Most viral illnesses that affect children are not serious. Most go away after several days without treatment.    The most common types of viruses that affect children are:    Cold and flu viruses.  Stomach viruses.  Viruses that cause fever and rash. These include illnesses such as measles, rubella, roseola, fifth disease, and chicken pox.    What are the causes?  Many types of viruses can cause illness. Viruses invade cells in your child's body, multiply, and cause the infected cells to malfunction or die. When the cell dies, it releases more of the virus. When this happens, your child develops symptoms of the illness, and the virus continues to spread to other cells. If the virus takes over the function of the cell, it can cause the cell to divide and grow out of control, as is the case when a virus causes cancer.    Different viruses get into the body in different ways. Your child is most likely to catch a virus from being exposed to another person who is infected with a virus. This may happen at home, at school, or at . Your child may get a virus by:    Breathing in droplets that have been coughed or sneezed into the air by an infected person. Cold and flu viruses, as well as viruses that cause fever and rash, are often spread through these droplets.  Touching anything that has been contaminated with the virus and then touching his or her nose, mouth, or eyes. Objects can be contaminated with a virus if:    They have droplets on them from a recent cough or sneeze of an infected person.  They have been in contact with the vomit or stool (feces) of an infected person. Stomach viruses can spread through vomit or stool.    Eating or drinking anything that has been in contact with the virus.  Being bitten by an insect or animal that carries the virus.  Being exposed to blood or fluids that contain the virus, either through an open cut or during a transfusion.    What are the signs or symptoms?  Symptoms vary depending on the type of virus and the location of the cells that it invades. Common symptoms of the main types of viral illnesses that affect children include:    Cold and flu viruses     Fever.  Sore throat.  Aches and headache.  Stuffy nose.  Earache.  Cough.  Stomach viruses     Fever.  Loss of appetite.  Vomiting.  Stomachache.  Diarrhea.  Fever and rash viruses     Fever.  Swollen glands.  Rash.  Runny nose.  How is this treated?  Most viral illnesses in children go away within 3?10 days. In most cases, treatment is not needed. Your child's health care provider may suggest over-the-counter medicines to relieve symptoms.    A viral illness cannot be treated with antibiotic medicines. Viruses live inside cells, and antibiotics do not get inside cells. Instead, antiviral medicines are sometimes used to treat viral illness, but these medicines are rarely needed in children.    Many childhood viral illnesses can be prevented with vaccinations (immunization shots). These shots help prevent flu and many of the fever and rash viruses.    Follow these instructions at home:  Medicines     Give over-the-counter and prescription medicines only as told by your child's health care provider. Cold and flu medicines are usually not needed. If your child has a fever, ask the health care provider what over-the-counter medicine to use and what amount (dosage) to give.  Do not give your child aspirin because of the association with Reye syndrome.  If your child is older than 4 years and has a cough or sore throat, ask the health care provider if you can give cough drops or a throat lozenge.  Do not ask for an antibiotic prescription if your child has been diagnosed with a viral illness. That will not make your child's illness go away faster. Also, frequently taking antibiotics when they are not needed can lead to antibiotic resistance. When this develops, the medicine no longer works against the bacteria that it normally fights.  Eating and drinking     Image   If your child is vomiting, give only sips of clear fluids. Offer sips of fluid frequently. Follow instructions from your child's health care provider about eating or drinking restrictions.  If your child is able to drink fluids, have the child drink enough fluid to keep his or her urine clear or pale yellow.  General instructions     Make sure your child gets a lot of rest.  If your child has a stuffy nose, ask your child's health care provider if you can use salt-water nose drops or spray.  If your child has a cough, use a cool-mist humidifier in your child's room.  If your child is older than 1 year and has a cough, ask your child's health care provider if you can give teaspoons of honey and how often.  Keep your child home and rested until symptoms have cleared up. Let your child return to normal activities as told by your child's health care provider.  Keep all follow-up visits as told by your child's health care provider. This is important.  How is this prevented?  ImageTo reduce your child's risk of viral illness:    Teach your child to wash his or her hands often with soap and water. If soap and water are not available, he or she should use hand .  Teach your child to avoid touching his or her nose, eyes, and mouth, especially if the child has not washed his or her hands recently.  If anyone in the household has a viral infection, clean all household surfaces that may have been in contact with the virus. Use soap and hot water. You may also use diluted bleach.  Keep your child away from people who are sick with symptoms of a viral infection.  Teach your child to not share items such as toothbrushes and water bottles with other people.  Keep all of your child's immunizations up to date.  Have your child eat a healthy diet and get plenty of rest.    Contact a health care provider if:  Your child has symptoms of a viral illness for longer than expected. Ask your child's health care provider how long symptoms should last.  Treatment at home is not controlling your child's symptoms or they are getting worse.  Get help right away if:  Your child who is younger than 3 months has a temperature of 100°F (38°C) or higher.  Your child has vomiting that lasts more than 24 hours.  Your child has trouble breathing.  Your child has a severe headache or has a stiff neck.  This information is not intended to replace advice given to you by your health care provider. Make sure you discuss any questions you have with your health care provider.

## 2022-04-13 NOTE — OB PROVIDER H&P - NSHPREVIEWOFSYSTEMS_GEN_ALL_CORE
normal appearance , without tenderness upon palpation , no deformities , trachea midline , Thyroid normal size , no thyroid nodules , no masses , no JVD , thyroid nontender as above
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
## 2023-02-02 NOTE — OB RN PATIENT PROFILE - STEPS TO INITIATE SKIN TO SKIN CONTACT DISCUSSED, INCLUDING INITIATING FATHER SKIN TO SKIN IF POSSIBLE.
[de-identified] : \par 02/01/23 - normal sinus rhythm, nonspecific ST abnormality\par  [de-identified] : \par 10/02/12 - normal LA, normal LV and RV size and function, LVEF 65%\par  [de-identified] : \par 04/15/12 (PCI) - RESOLUTE to mRCA 99%\par 04/15/12 (CATH) - pLAD 20%, oD1 50%, mRCA 99%, LVEDP 16 mmHg Statement Selected

## 2023-08-06 NOTE — PHYSICAL EXAM
[No Acute Distress] : no acute distress [Well Nourished] : well nourished [Well Developed] : well developed [TextBox_2] : Pt is obese Patient

## 2024-06-25 NOTE — OB RN DELIVERY SUMMARY - NS_RNDELIVATTEST_OBGYN_ALL_OB
FOLLOW-UP EXAMINATION  [facility]      SUBJECTIVE     History of Present Illness:  Madisyn Modi is a 27 y.o. F here for suture removal. Pt was here on 6/20 for nexplanon removal. One stitch was placed. Pt endorses some soreness at the site after initial removal which has improved. There is still mild pain with suture tugging. Denies any fever, chills, purulent discharge.     Pt also mentions new spot on her R thigh which has been bothering her for 2 weeks. She describes it as red and irritated. Bothers her when rubbing against something.       Review of Systems:  10 systems were reviewed and were negative except where noted above.  Substantive elements include:  Review of Systems   Constitutional:  Negative for chills and fever.          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  2020-10: Family history of colon cancer  2012-11: Depression  2012-11: Asthma (CMD)  2012-11: Anxiety      Past Medical History:   Diagnosis Date    Anxiety disorder     Asthma (CMD)     Blood transfusion without reported diagnosis     following heavy period as a teenager    Cervical polyp     Chlamydia infection     resolved    Depression     Heterozygous for MTHFR gene mutation     Subchorionic hematoma in first trimester (CMD) 2022       Past Surgical History:   Procedure Laterality Date    Appendectomy      Oral surgery procedure         Current Outpatient Medications   Medication Sig    pantoprazole (PROTONIX) 40 MG tablet Take 1 tablet by mouth daily. (Patient not taking: Reported on 5/31/2024)    ondansetron  (ZOFRAN) 8 MG tablet Take 8 mg by mouth every 8 hours as needed for Nausea.    cholecalciferol (VITAMIN D) 25 mcg (1,000 units) tablet prn    albuterol 108 (90 Base) MCG/ACT inhaler Inhale 2 puffs into the lungs every 4 hours as needed for Shortness of Breath or Wheezing. Indications: Asthma    fluticasone (FLONASE) 50 MCG/ACT nasal spray Spray 1 spray in each nostril daily. (Patient taking differently: Spray 1 spray in each nostril daily. 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      Dairy Digestive Other (See Comments)    Seasonal Runny Nose    Tegretol HIVES and RASH    Cefdinir HIVES and RASH    Codeine HIVES       Social History     Socioeconomic History    Marital status: Significant other     Spouse name: Lan Marinelli    Number of children: 0    Years of education: Not on file    Highest education level: High school graduate   Occupational History    Occupation:      Comment: 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   Tobacco Use    Smoking status: Never     Passive exposure: Never    Smokeless tobacco: Never   Vaping Use    Vaping status: never used   Substance and Sexual Activity    Alcohol use: Not Currently     Comment: Rare -- 1-2 x since pregnancy    Drug use: Yes     Types: Marijuana     Comment: frequently    Sexual activity: Yes     Partners: Male     Birth control/protection: Implant   Other Topics Concern    Not on file   Social History Narrative    Not on file     Social Determinants of Health     Financial Resource Strain: Low Risk  (1/14/2023)    Financial  Resource Strain     Unable to Get: None   Food Insecurity: Not At Risk (1/14/2023)    Food Insecurity     Food Insecurity: Worried or Stressed about Money for Food: Never   Transportation Needs: Not At Risk (1/14/2023)    PRAPARE - Transportation     Lack of Transportation (Medical): No     Lack of Transportation (Non-Medical): No   Physical Activity: Not on File (6/13/2022)    Received from [email or web address]     Physical Activity     Physical Activity: 0   Stress: Not on File (6/13/2022)    Received from [email or web address]     Stress     Stress: 0   Social Connections: Low Risk  (1/14/2023)    Social Connections     Social Connectivity: 5 or more times a week   Interpersonal Safety: Not on file (1/14/2023)     Social History     Social History Narrative    Not on file         OBJECTIVE     Visit Vitals  /74 (BP Location: LUE - Left upper extremity, Patient Position: Sitting)   Pulse (!) 57   Temp 97.4 °F (36.3 °C) (Temporal)   Wt 77 kg (169 lb 12.8 oz)   LMP 06/17/2024 (Exact Date)   SpO2 98%   BMI 29.15 kg/m²       Physical Examination:    Physical Exam  Constitutional:       Appearance: Normal appearance.   Neurological:      Mental Status: She is alert.     Skin:  L arm 1cm incision well healed. No discharge. No tenderness.   R thigh with mildly erythematous soft nodule. No tenderness.      Psychologic:  Pleasant and cooperative.    Suture Removal    Date/Time: 6/26/2024 10:06 AM    Performed by: Corrine Crockett MD  Authorized by: Corrine Crockett MD  Body area: upper extremity  Location details: left upper arm  Wound Appearance: clean  Sutures Removed: 1  Comments: Bandaid applied, pt tolerated procedure well without complication           ASSESSMENT  &  PLAN     Diagnoses and associated orders for this visit:  1. Visit for suture removal    Madisyn Modi is a 27 y.o. F here for suture removal.     #Suture removal  Pt tolerated removal well. Informed to call office if wound gets increasingly erythematous, has purulent discharge,  or significantly increased pain.      #R thigh nodule  - benign in appearance, likely deeper inflamed acne   - continue to monitor, discussed using warm compresses to help    Return for next annual visit.        Signed,  Corrine Crockett MD  PGY-1, Family Medicine    Laps, needles and instrument count was correct